# Patient Record
Sex: MALE | Race: WHITE
[De-identification: names, ages, dates, MRNs, and addresses within clinical notes are randomized per-mention and may not be internally consistent; named-entity substitution may affect disease eponyms.]

---

## 2017-02-27 ENCOUNTER — HOSPITAL ENCOUNTER (OUTPATIENT)
Dept: HOSPITAL 39 - BFHH | Age: 77
Discharge: HOME | End: 2017-02-27
Attending: FAMILY MEDICINE
Payer: MEDICARE

## 2017-02-27 DIAGNOSIS — R26.2: Primary | ICD-10-CM

## 2017-03-13 ENCOUNTER — HOSPITAL ENCOUNTER (OUTPATIENT)
Dept: HOSPITAL 39 - BFHH | Age: 77
Discharge: HOME | End: 2017-03-13
Attending: FAMILY MEDICINE
Payer: MEDICARE

## 2017-03-13 DIAGNOSIS — E11.9: Primary | ICD-10-CM

## 2017-03-13 DIAGNOSIS — E78.4: ICD-10-CM

## 2017-03-13 DIAGNOSIS — D50.9: ICD-10-CM

## 2017-03-13 DIAGNOSIS — D64.9: Primary | ICD-10-CM

## 2017-03-13 DIAGNOSIS — I10: ICD-10-CM

## 2017-03-13 DIAGNOSIS — D64.9: ICD-10-CM

## 2017-05-02 ENCOUNTER — HOSPITAL ENCOUNTER (OUTPATIENT)
Dept: HOSPITAL 39 - GMAM | Age: 77
Discharge: HOME | End: 2017-05-02
Attending: FAMILY MEDICINE
Payer: MEDICARE

## 2017-05-02 DIAGNOSIS — D50.8: Primary | ICD-10-CM

## 2017-05-02 DIAGNOSIS — E53.8: ICD-10-CM

## 2017-06-19 ENCOUNTER — HOSPITAL ENCOUNTER (OUTPATIENT)
Dept: HOSPITAL 39 - BFHH | Age: 77
End: 2017-06-19
Attending: FAMILY MEDICINE
Payer: COMMERCIAL

## 2017-06-19 DIAGNOSIS — I10: ICD-10-CM

## 2017-06-19 DIAGNOSIS — E78.2: ICD-10-CM

## 2017-06-19 DIAGNOSIS — E11.9: Primary | ICD-10-CM

## 2017-06-19 DIAGNOSIS — D50.8: ICD-10-CM

## 2017-08-18 ENCOUNTER — HOSPITAL ENCOUNTER (OUTPATIENT)
Dept: HOSPITAL 39 - BFHH | Age: 77
Discharge: HOME | End: 2017-08-18
Attending: INTERNAL MEDICINE
Payer: COMMERCIAL

## 2017-08-18 DIAGNOSIS — I10: ICD-10-CM

## 2017-08-18 DIAGNOSIS — D50.8: Primary | ICD-10-CM

## 2017-10-20 ENCOUNTER — HOSPITAL ENCOUNTER (OUTPATIENT)
Dept: HOSPITAL 39 - BFHH | Age: 77
Discharge: HOME | End: 2017-10-20
Attending: FAMILY MEDICINE
Payer: COMMERCIAL

## 2017-10-20 DIAGNOSIS — R26.2: ICD-10-CM

## 2017-10-20 DIAGNOSIS — J44.9: Primary | ICD-10-CM

## 2017-10-20 DIAGNOSIS — Z12.5: ICD-10-CM

## 2017-10-20 DIAGNOSIS — I10: ICD-10-CM

## 2017-10-20 DIAGNOSIS — G91.2: ICD-10-CM

## 2017-10-20 DIAGNOSIS — R41.2: ICD-10-CM

## 2017-10-20 DIAGNOSIS — E11.9: ICD-10-CM

## 2017-10-23 ENCOUNTER — HOSPITAL ENCOUNTER (OUTPATIENT)
Dept: HOSPITAL 39 - GMAM | Age: 77
Discharge: HOME | End: 2017-10-23
Attending: FAMILY MEDICINE
Payer: COMMERCIAL

## 2017-10-23 DIAGNOSIS — R94.6: Primary | ICD-10-CM

## 2018-01-29 ENCOUNTER — HOSPITAL ENCOUNTER (OUTPATIENT)
Dept: HOSPITAL 39 - GMAM | Age: 78
End: 2018-01-29
Attending: FAMILY MEDICINE
Payer: COMMERCIAL

## 2018-01-29 DIAGNOSIS — D50.9: Primary | ICD-10-CM

## 2018-02-16 ENCOUNTER — HOSPITAL ENCOUNTER (OUTPATIENT)
Dept: HOSPITAL 39 - MRI | Age: 78
End: 2018-02-16
Payer: COMMERCIAL

## 2018-02-16 DIAGNOSIS — R41.2: Primary | ICD-10-CM

## 2018-02-16 DIAGNOSIS — R26.2: ICD-10-CM

## 2018-02-16 DIAGNOSIS — G91.9: ICD-10-CM

## 2018-02-17 NOTE — MRI
EXAM DESCRIPTION: 

Brain w/o contrast: MRI.



CLINICAL HISTORY: 

R41.2 R26.2. Retrograde amnesia. Difficulty in walking.



COMPARISON: 

MRI scan of the brain without contrast 12/12/2016.



TECHNIQUE: 

Multiplanar, high-field MRI unit, multiple diffusion sequences,

multiple conventional sequences without contrast.



FINDINGS: 

Again noted is dilation of the lateral ventricles and the third

ventricle and normal size of the fourth ventricle. Temporal horns

of the lateral ventricles are not significantly dilated relative

to the remainder of the lateral ventricles. Stable since the

prior study. Minimal prominence of the basilar cisterns and

cerebellar folia stable since the prior study. No effacement of

the cortical sulci. No midline shift.



Bilateral multiple foci of bright FLAIR and T2-weighted signal in

the periventricular white matter and gray-white matter junctions

of the cerebral hemispheres. .  Small foci of bright signal in

the bilateral thalami. Normal signal in the bilateral basal

ganglia. No hemorrhage, no cerebral edema, no mass-effect. Normal

signal in the brainstem and cerebellar hemispheres. No

hemorrhage, no cerebral edema, no mass-effect.

 

Concordance of the diffusion and non-diffusion sequences with no

diffusion restriction. No midline shift. No extra-axial

hemorrhage.

 

Normal flow signal void in the major vessels of the [place]

[place], and the venous sinuses. IACs are symmetric bilaterally.

Normal signal in the bilateral mastoid air cells. No mass effect

in the bilateral cerebellopontine angles.   Pituitary gland

occupies most of the sella. Base of the cerebellar tonsils is

above the foramen magnum. Minimal mucosal periosteal thickening

in the paranasal sinuses. Possible metallic object causing

magnetic distortion of the frontal bone overlying the right

frontal sinus. No change since the prior study. Otherwise, the

bony calvarium is intact.



IMPRESSION: 

1. Nonobstructive hydrocephalus is stable since the prior study

December 2016. Temporal horns of the lateral ventricles are not

abnormally distended. Fourth ventricle is normal size. No

hemorrhage or periventricular cerebral edema. No midline shift.

2. Periventricular and central cortical white matter multiple

foci. Most likely related to cerebral microvascular disease.

Stable since the prior study. Minimal cortical atrophy is also

stable.

3. Noncontrast MRI diffusion study showing no evidence of acute

or subacute infarction. 



Electronically signed by:  Andrew Scott MD  2/17/2018 9:20 AM [facility]

Workstation: [id number]

## 2018-05-10 ENCOUNTER — HOSPITAL ENCOUNTER (OUTPATIENT)
Dept: HOSPITAL 39 - GMAM | Age: 78
End: 2018-05-10
Attending: FAMILY MEDICINE
Payer: COMMERCIAL

## 2018-05-10 DIAGNOSIS — E11.40: ICD-10-CM

## 2018-05-10 DIAGNOSIS — I10: Primary | ICD-10-CM

## 2018-05-10 DIAGNOSIS — E78.2: ICD-10-CM

## 2018-06-21 ENCOUNTER — HOSPITAL ENCOUNTER (OUTPATIENT)
Dept: HOSPITAL 39 - BFHH | Age: 78
End: 2018-06-21
Attending: INTERNAL MEDICINE
Payer: COMMERCIAL

## 2018-06-21 DIAGNOSIS — D50.8: ICD-10-CM

## 2018-06-21 DIAGNOSIS — I11.0: Primary | ICD-10-CM

## 2018-06-28 ENCOUNTER — HOSPITAL ENCOUNTER (OUTPATIENT)
Dept: HOSPITAL 39 - BFHH | Age: 78
End: 2018-06-28
Attending: FAMILY MEDICINE
Payer: COMMERCIAL

## 2018-06-28 DIAGNOSIS — D50.8: ICD-10-CM

## 2018-06-28 DIAGNOSIS — I11.0: Primary | ICD-10-CM

## 2018-06-28 DIAGNOSIS — E11.42: ICD-10-CM

## 2018-07-26 ENCOUNTER — HOSPITAL ENCOUNTER (OUTPATIENT)
Dept: HOSPITAL 39 - BFHH | Age: 78
End: 2018-07-26
Attending: FAMILY MEDICINE
Payer: COMMERCIAL

## 2018-07-26 DIAGNOSIS — I50.9: ICD-10-CM

## 2018-07-26 DIAGNOSIS — D50.8: Primary | ICD-10-CM

## 2018-07-26 DIAGNOSIS — I11.0: ICD-10-CM

## 2018-08-02 ENCOUNTER — HOSPITAL ENCOUNTER (OUTPATIENT)
Dept: HOSPITAL 39 - US | Age: 78
End: 2018-08-02
Attending: NURSE PRACTITIONER
Payer: COMMERCIAL

## 2018-08-02 DIAGNOSIS — M79.631: Primary | ICD-10-CM

## 2018-08-02 NOTE — US
EXAM DESCRIPTION:



Venous,Upper Extremity RT



CLINICAL HISTORY:



77 years Male, PAIN IN RIGHT FOREARM



COMPARISON:



None.



TECHNIQUE:



2-D grayscale and color venous duplex Doppler evaluation of the

right upper extremity is performed.



FINDINGS:



Normal vascular flow and phasicity is seen in the right internal

jugular vein. Right subclavian vein is somewhat small, but

patent.



Normal vascular flow and compressibility of the right axillary,

brachial, basilic, cephalic, radial, and ulnar veins is seen

without ultrasound evidence of venous thrombosis.



IMPRESSION:



  No ultrasound evidence of venous thrombosis of the right upper

extremity. 



Electronically signed by:  Wolf Warner MD  8/2/2018 12:10 PM CDT

Workstation: [id number]

## 2018-08-06 ENCOUNTER — HOSPITAL ENCOUNTER (OUTPATIENT)
Dept: HOSPITAL 39 - GMAM | Age: 78
End: 2018-08-06
Attending: FAMILY MEDICINE
Payer: COMMERCIAL

## 2018-08-06 DIAGNOSIS — L03.119: Primary | ICD-10-CM

## 2018-08-06 DIAGNOSIS — E87.6: ICD-10-CM

## 2018-08-07 ENCOUNTER — HOSPITAL ENCOUNTER (OUTPATIENT)
Dept: HOSPITAL 39 - GMAM | Age: 78
End: 2018-08-07
Attending: FAMILY MEDICINE
Payer: COMMERCIAL

## 2018-08-07 DIAGNOSIS — L03.119: Primary | ICD-10-CM

## 2018-08-30 ENCOUNTER — HOSPITAL ENCOUNTER (OUTPATIENT)
Dept: HOSPITAL 39 - GMAM | Age: 78
LOS: 1 days | End: 2018-08-31
Attending: FAMILY MEDICINE
Payer: COMMERCIAL

## 2018-08-30 DIAGNOSIS — D50.8: Primary | ICD-10-CM

## 2018-08-30 DIAGNOSIS — N18.9: ICD-10-CM

## 2018-08-30 DIAGNOSIS — E11.42: ICD-10-CM

## 2018-08-30 DIAGNOSIS — I13.0: ICD-10-CM

## 2018-09-19 ENCOUNTER — HOSPITAL ENCOUNTER (OUTPATIENT)
Dept: HOSPITAL 39 - BFHH | Age: 78
End: 2018-09-19
Attending: FAMILY MEDICINE
Payer: COMMERCIAL

## 2018-09-19 DIAGNOSIS — I13.0: ICD-10-CM

## 2018-09-19 DIAGNOSIS — N18.9: Primary | ICD-10-CM

## 2018-09-19 DIAGNOSIS — E87.6: ICD-10-CM

## 2018-11-26 ENCOUNTER — HOSPITAL ENCOUNTER (OUTPATIENT)
Dept: HOSPITAL 39 - BFHH | Age: 78
End: 2018-11-26
Attending: INTERNAL MEDICINE
Payer: COMMERCIAL

## 2018-11-26 DIAGNOSIS — D50.8: Primary | ICD-10-CM

## 2018-12-20 ENCOUNTER — HOSPITAL ENCOUNTER (OUTPATIENT)
Dept: HOSPITAL 39 - BFHH | Age: 78
End: 2018-12-20
Attending: FAMILY MEDICINE
Payer: COMMERCIAL

## 2018-12-20 DIAGNOSIS — I13.0: Primary | ICD-10-CM

## 2018-12-20 DIAGNOSIS — E11.22: ICD-10-CM

## 2019-03-28 ENCOUNTER — HOSPITAL ENCOUNTER (OUTPATIENT)
Dept: HOSPITAL 39 - GMAM | Age: 79
End: 2019-03-28
Attending: FAMILY MEDICINE
Payer: COMMERCIAL

## 2019-03-28 DIAGNOSIS — E87.6: Primary | ICD-10-CM

## 2019-12-10 ENCOUNTER — HOSPITAL ENCOUNTER (OUTPATIENT)
Dept: HOSPITAL 39 - GMAM | Age: 79
End: 2019-12-10
Attending: FAMILY MEDICINE
Payer: COMMERCIAL

## 2019-12-10 DIAGNOSIS — Z12.5: Primary | ICD-10-CM

## 2020-02-17 ENCOUNTER — HOSPITAL ENCOUNTER (OUTPATIENT)
Dept: HOSPITAL 39 - LAB.O | Age: 80
End: 2020-02-17
Attending: INTERNAL MEDICINE
Payer: COMMERCIAL

## 2020-02-17 DIAGNOSIS — Z86.19: ICD-10-CM

## 2020-02-17 DIAGNOSIS — R94.5: ICD-10-CM

## 2020-02-17 DIAGNOSIS — Z86.010: ICD-10-CM

## 2020-02-17 DIAGNOSIS — D50.9: Primary | ICD-10-CM

## 2020-02-17 DIAGNOSIS — K75.0: ICD-10-CM

## 2020-04-21 ENCOUNTER — HOSPITAL ENCOUNTER (OUTPATIENT)
Dept: HOSPITAL 39 - GMAM | Age: 80
End: 2020-04-21
Attending: FAMILY MEDICINE
Payer: COMMERCIAL

## 2020-04-21 DIAGNOSIS — E11.9: ICD-10-CM

## 2020-04-21 DIAGNOSIS — E78.2: ICD-10-CM

## 2020-04-21 DIAGNOSIS — E53.8: Primary | ICD-10-CM

## 2020-04-21 DIAGNOSIS — E55.9: ICD-10-CM

## 2020-04-21 DIAGNOSIS — I10: ICD-10-CM

## 2020-04-30 ENCOUNTER — HOSPITAL ENCOUNTER (OUTPATIENT)
Dept: HOSPITAL 39 - GMAM | Age: 80
End: 2020-04-30
Attending: FAMILY MEDICINE
Payer: COMMERCIAL

## 2020-04-30 DIAGNOSIS — E53.8: Primary | ICD-10-CM

## 2020-04-30 DIAGNOSIS — D50.9: ICD-10-CM

## 2020-05-06 ENCOUNTER — HOSPITAL ENCOUNTER (OUTPATIENT)
Dept: HOSPITAL 39 - MRI | Age: 80
End: 2020-05-06
Attending: FAMILY MEDICINE
Payer: COMMERCIAL

## 2020-05-06 DIAGNOSIS — M75.91: ICD-10-CM

## 2020-05-06 DIAGNOSIS — S43.101A: ICD-10-CM

## 2020-05-06 DIAGNOSIS — M19.011: ICD-10-CM

## 2020-05-06 DIAGNOSIS — S46.011A: Primary | ICD-10-CM

## 2020-05-06 DIAGNOSIS — M75.21: ICD-10-CM

## 2020-05-06 DIAGNOSIS — S43.431A: ICD-10-CM

## 2020-05-06 DIAGNOSIS — M62.511: ICD-10-CM

## 2020-07-02 ENCOUNTER — HOSPITAL ENCOUNTER (OUTPATIENT)
Dept: HOSPITAL 39 - GMAM | Age: 80
End: 2020-07-02
Attending: FAMILY MEDICINE
Payer: COMMERCIAL

## 2020-07-02 DIAGNOSIS — D50.9: Primary | ICD-10-CM

## 2020-09-21 ENCOUNTER — HOSPITAL ENCOUNTER (OUTPATIENT)
Dept: HOSPITAL 39 - GMAM | Age: 80
End: 2020-09-21
Attending: FAMILY MEDICINE
Payer: COMMERCIAL

## 2020-09-21 DIAGNOSIS — E11.9: ICD-10-CM

## 2020-09-21 DIAGNOSIS — E78.2: ICD-10-CM

## 2020-09-21 DIAGNOSIS — E53.8: ICD-10-CM

## 2020-09-21 DIAGNOSIS — I10: ICD-10-CM

## 2020-09-21 DIAGNOSIS — D50.9: Primary | ICD-10-CM

## 2020-09-21 DIAGNOSIS — E83.42: ICD-10-CM

## 2020-09-21 DIAGNOSIS — E55.9: ICD-10-CM

## 2020-09-25 ENCOUNTER — HOSPITAL ENCOUNTER (OUTPATIENT)
Dept: HOSPITAL 39 - RAD | Age: 80
End: 2020-09-25
Attending: NURSE PRACTITIONER
Payer: COMMERCIAL

## 2020-09-25 DIAGNOSIS — R07.81: Primary | ICD-10-CM

## 2020-09-28 NOTE — RAD
EXAM DESCRIPTION: Ribs,Left 3 Views (accession [id number]),

Ribs,Right 3 Views (accession [id number])



CLINICAL HISTORY: 80 years Male, PLEURODYNIA



COMPARISON: None.



FINDINGS: 



Left ribs



No fracture. No bony destructive lesion. Calcified granuloma in

the left upper lung.



Right RIBS



Bone detail films of the right ribs are obtained. No fracture. No

bony destructive lesion.



IMPRESSION:



Negative for fracture.



Electronically signed by:  Mandeep Zhang MD  9/28/2020 7:58

AM CDT Workstation: [id number]

## 2020-09-28 NOTE — RAD
EXAM DESCRIPTION: Ribs,Left 3 Views (accession [id number]),

Ribs,Right 3 Views (accession [id number])



CLINICAL HISTORY: 80 years Male, PLEURODYNIA



COMPARISON: None.



FINDINGS: 



Left ribs



No fracture. No bony destructive lesion. Calcified granuloma in

the left upper lung.



Right RIBS



Bone detail films of the right ribs are obtained. No fracture. No

bony destructive lesion.



IMPRESSION:



Negative for fracture.



Electronically signed by:  Mandeep Zhang MD  9/28/2020 7:58

AM CDT Workstation: [id number]

## 2020-11-24 ENCOUNTER — HOSPITAL ENCOUNTER (OUTPATIENT)
Dept: HOSPITAL 39 - LAB.O | Age: 80
End: 2020-11-24
Attending: INTERNAL MEDICINE
Payer: COMMERCIAL

## 2020-11-24 DIAGNOSIS — D64.9: Primary | ICD-10-CM

## 2020-11-24 DIAGNOSIS — D50.9: ICD-10-CM

## 2020-12-08 ENCOUNTER — HOSPITAL ENCOUNTER (OUTPATIENT)
Dept: HOSPITAL 39 - LAB.O | Age: 80
End: 2020-12-08
Attending: INTERNAL MEDICINE
Payer: COMMERCIAL

## 2020-12-08 DIAGNOSIS — D50.9: ICD-10-CM

## 2020-12-08 DIAGNOSIS — D64.9: Primary | ICD-10-CM

## 2021-01-08 ENCOUNTER — HOSPITAL ENCOUNTER (OUTPATIENT)
Dept: HOSPITAL 39 - INFRM | Age: 81
End: 2021-01-08
Attending: INTERNAL MEDICINE
Payer: COMMERCIAL

## 2021-01-08 DIAGNOSIS — D64.9: Primary | ICD-10-CM

## 2021-01-25 ENCOUNTER — HOSPITAL ENCOUNTER (OUTPATIENT)
Dept: HOSPITAL 39 - BFHH | Age: 81
End: 2021-01-25
Attending: FAMILY MEDICINE
Payer: COMMERCIAL

## 2021-01-25 DIAGNOSIS — G91.0: ICD-10-CM

## 2021-01-25 DIAGNOSIS — E78.2: ICD-10-CM

## 2021-01-25 DIAGNOSIS — E11.9: Primary | ICD-10-CM

## 2021-01-25 DIAGNOSIS — M62.81: ICD-10-CM
